# Patient Record
Sex: MALE | Race: WHITE | NOT HISPANIC OR LATINO | Employment: OTHER | URBAN - NONMETROPOLITAN AREA
[De-identification: names, ages, dates, MRNs, and addresses within clinical notes are randomized per-mention and may not be internally consistent; named-entity substitution may affect disease eponyms.]

---

## 2024-11-20 ENCOUNTER — OFFICE VISIT (OUTPATIENT)
Dept: CARDIOLOGY | Facility: CLINIC | Age: 89
End: 2024-11-20
Payer: MEDICARE

## 2024-11-20 VITALS
SYSTOLIC BLOOD PRESSURE: 176 MMHG | HEART RATE: 93 BPM | WEIGHT: 176.8 LBS | OXYGEN SATURATION: 95 % | DIASTOLIC BLOOD PRESSURE: 89 MMHG | BODY MASS INDEX: 26.8 KG/M2 | HEIGHT: 68 IN

## 2024-11-20 DIAGNOSIS — I10 ESSENTIAL HYPERTENSION: ICD-10-CM

## 2024-11-20 DIAGNOSIS — R06.02 SOB (SHORTNESS OF BREATH): ICD-10-CM

## 2024-11-20 DIAGNOSIS — R07.2 PRECORDIAL PAIN: ICD-10-CM

## 2024-11-20 DIAGNOSIS — E78.5 DYSLIPIDEMIA: Primary | ICD-10-CM

## 2024-11-20 DIAGNOSIS — Z87.891 FORMER SMOKER: ICD-10-CM

## 2024-11-20 PROBLEM — K21.9 GASTROESOPHAGEAL REFLUX DISEASE: Status: ACTIVE | Noted: 2024-11-20

## 2024-11-20 PROBLEM — Z86.69 HISTORY OF GUILLAIN-BARRE SYNDROME: Status: ACTIVE | Noted: 2024-11-20

## 2024-11-20 PROBLEM — Z86.69 HISTORY OF MACULAR DEGENERATION: Status: ACTIVE | Noted: 2022-11-06

## 2024-11-20 PROBLEM — E11.9 TYPE 2 DIABETES MELLITUS: Status: ACTIVE | Noted: 2023-12-04

## 2024-11-20 PROBLEM — C44.91 BASAL CELL CARCINOMA: Status: ACTIVE | Noted: 2024-11-20

## 2024-11-20 PROBLEM — N40.0 BENIGN PROSTATIC HYPERPLASIA: Status: ACTIVE | Noted: 2022-11-06

## 2024-11-20 PROCEDURE — 93000 ELECTROCARDIOGRAM COMPLETE: CPT | Performed by: INTERNAL MEDICINE

## 2024-11-20 PROCEDURE — 99204 OFFICE O/P NEW MOD 45 MIN: CPT | Performed by: INTERNAL MEDICINE

## 2024-11-20 RX ORDER — BRIMONIDINE TARTRATE 2 MG/ML
SOLUTION/ DROPS OPHTHALMIC
COMMUNITY

## 2024-11-20 RX ORDER — ATORVASTATIN CALCIUM 40 MG/1
40 TABLET, FILM COATED ORAL DAILY
Qty: 30 TABLET | Refills: 11 | Status: SHIPPED | OUTPATIENT
Start: 2024-11-20

## 2024-11-20 RX ORDER — NITROGLYCERIN 0.4 MG/1
TABLET SUBLINGUAL
Qty: 25 TABLET | Refills: 2 | Status: SHIPPED | OUTPATIENT
Start: 2024-11-20

## 2024-11-20 RX ORDER — ASPIRIN 81 MG/1
81 TABLET ORAL DAILY
Qty: 30 TABLET | Refills: 11 | Status: SHIPPED | OUTPATIENT
Start: 2024-11-20

## 2024-11-20 RX ORDER — DUTASTERIDE 0.5 MG/1
0.5 CAPSULE, LIQUID FILLED ORAL DAILY
COMMUNITY

## 2024-11-20 RX ORDER — AMLODIPINE BESYLATE 2.5 MG/1
2.5 TABLET ORAL DAILY
Qty: 30 TABLET | Refills: 11 | Status: SHIPPED | OUTPATIENT
Start: 2024-11-20

## 2024-11-20 RX ORDER — TAMSULOSIN HYDROCHLORIDE 0.4 MG/1
1 CAPSULE ORAL DAILY
COMMUNITY

## 2024-11-20 RX ORDER — FAMOTIDINE 20 MG/1
20 TABLET, FILM COATED ORAL DAILY
COMMUNITY

## 2024-11-20 RX ORDER — METOPROLOL TARTRATE 25 MG/1
12.5 TABLET, FILM COATED ORAL 2 TIMES DAILY
Qty: 30 TABLET | Refills: 11 | Status: SHIPPED | OUTPATIENT
Start: 2024-11-20

## 2024-11-20 NOTE — PROGRESS NOTES
Subjective   Esvin Flower is a 89 y.o. male     Chief Complaint   Patient presents with    Establish Care     Here for eval. Per pcp for ? Abnl. Office EKG per patient report    Shortness of Breath    Hyperlipidemia    Hypertension    Chest Pain       PROBLEM LIST:     SOB  HTN  Hyperlipidemia  DM, type 2  Macular Degen.  Skin CA s/p excision  GERD  Former smoker    Specialty Problems    None        HPI:  Mr. Esvin Flower is a 89-year-old patient of Dr Aleksandr Perla seen today for evaluation of EKG abnormalities, hypertension, and chest pain.    The patient is a very active 89-year-old male who states that he had an EKG done at Dr. Perla's office which prompted his evaluation here.  Initially, Mr. Flower described no symptoms suggestive of coronary artery disease.  However, on repeated questioning he describes that he has tightness in his chest when his throat is dry and he is having upper airway congestion.  This may or may not be an anginal equivalent.  However, he also describes a burning retrosternal chest discomfort which only occurs during physical activity.    Mr. Flower can do housework including mopping sweeping and vacuuming without difficulty.  However, if he walks uphill outdoors or carries objects he will develop chest burning and an increased sense of dyspnea.  Symptoms are always exertional in nature and always self-limited with rest.  There are no associated palpitations or lightheadedness or dizziness.  Symptoms have been relatively stable over.    The patient has no symptoms of congestive heart failure or dysrhythmia.                    PRIOR MEDICATIONS    Current Outpatient Medications on File Prior to Visit   Medication Sig Dispense Refill    brimonidine (ALPHAGAN) 0.2 % ophthalmic solution INSTILL 1 DROP INTO EACH EYE TWICE DAILY      dutasteride (AVODART) 0.5 MG capsule Take 1 capsule by mouth Daily.      famotidine (PEPCID) 20 MG tablet Take 1 tablet by mouth Daily.      Multiple Vitamins-Minerals  (PRESERVISION AREDS 2 PO) Take  by mouth 2 (Two) Times a Day.      tamsulosin (FLOMAX) 0.4 MG capsule 24 hr capsule Take 1 capsule by mouth Daily.      Unable to find 1 each 2 (Two) Times a Day. Super Beat Prostate       No current facility-administered medications on file prior to visit.       ALLERGIES:    Morphine and Penicillins    PAST MEDICAL HISTORY:    Past Medical History:   Diagnosis Date    Cancer     skin CA    Diabetes mellitus     Guillain Barré syndrome     Hyperlipidemia     Hypertension     Macular degeneration        SURGICAL HISTORY:    Past Surgical History:   Procedure Laterality Date    CYST REMOVAL      HERNIA REPAIR      SKIN CANCER EXCISION         SOCIAL HISTORY:    Social History     Socioeconomic History    Marital status:    Tobacco Use    Smoking status: Former     Types: Cigarettes    Smokeless tobacco: Never    Tobacco comments:     Used to smoke 1 PPD for approx. 20 yrs.    Substance and Sexual Activity    Alcohol use: Never    Drug use: Never       FAMILY HISTORY:    Family History   Problem Relation Age of Onset    No Known Problems Mother     Heart attack Father        Review of Systems   Constitutional:  Positive for diaphoresis (occas. night sweats) and fatigue. Negative for chills, fever and unexpected weight change.   HENT: Negative.     Eyes: Negative.    Respiratory:  Positive for shortness of breath.    Cardiovascular:  Positive for chest pain. Negative for palpitations and leg swelling.   Gastrointestinal:  Positive for constipation. Negative for blood in stool.   Endocrine: Positive for cold intolerance.   Genitourinary: Negative.    Musculoskeletal:  Positive for arthralgias and myalgias.   Skin: Negative.    Allergic/Immunologic: Negative.    Neurological: Negative.    Hematological:  Bruises/bleeds easily.   Psychiatric/Behavioral: Negative.         VISIT VITALS:  Vitals:    11/20/24 1042   BP: 176/89   BP Location: Left arm   Patient Position: Sitting   Pulse:  "93   SpO2: 95%   Weight: 80.2 kg (176 lb 12.8 oz)   Height: 172.7 cm (68\")      /89 (BP Location: Left arm, Patient Position: Sitting)   Pulse 93   Ht 172.7 cm (68\")   Wt 80.2 kg (176 lb 12.8 oz)   SpO2 95%   BMI 26.88 kg/m²     RECENT LABS:    Objective       Physical Exam  Vitals and nursing note reviewed.   Constitutional:       General: He is not in acute distress.     Appearance: He is well-developed.   HENT:      Head: Normocephalic and atraumatic.   Eyes:      Conjunctiva/sclera: Conjunctivae normal.      Pupils: Pupils are equal, round, and reactive to light.   Neck:      Vascular: No carotid bruit, hepatojugular reflux or JVD.      Trachea: No tracheal deviation.      Comments: Nl. Carotid upstrokes  Cardiovascular:      Rate and Rhythm: Normal rate and regular rhythm.      Pulses:           Radial pulses are 2+ on the right side and 2+ on the left side.      Heart sounds: Normal heart sounds, S1 normal and S2 normal. No murmur heard.     No friction rub. S4 sounds present. No S3 sounds.   Pulmonary:      Effort: Pulmonary effort is normal.      Breath sounds: Normal breath sounds. No wheezing, rhonchi or rales.      Comments: Nl. Expir. Phase  Nl. Breath sound intensity    Abdominal:      General: Bowel sounds are normal. There is no distension or abdominal bruit.      Palpations: Abdomen is soft. There is no mass.      Tenderness: There is no abdominal tenderness. There is no guarding or rebound.      Comments: No organomegaly   Musculoskeletal:         General: No tenderness or deformity. Normal range of motion.      Cervical back: Normal range of motion and neck supple.      Right lower leg: Edema present.      Left lower leg: Edema present.      Comments: LLE, trace edema, palpable pedal pulses  RLE, trace edema, palpable PT pedal pulse   Skin:     General: Skin is warm and dry.      Coloration: Skin is not pale.      Findings: No erythema or rash.   Neurological:      Mental Status: He is " alert and oriented to person, place, and time.   Psychiatric:         Behavior: Behavior normal.         Thought Content: Thought content normal.         Judgment: Judgment normal.           ECG 12 Lead    Date/Time: 11/20/2024 10:57 AM  Performed by: Alvarado Knight MD    Authorized by: Alvarado Knight MD  Previous ECG: no previous ECG available            Assessment & Plan   #1.  Exertional retrosternal burning which I feel is compatible with angina.  As the patient has no high risk clinical markers I think that empiric therapy is appropriate at this time as well as reserving formal restratification for an inadequate response to medical therapy or if there are any clinical findings to suggest acute coronary syndrome.  Therefore we will start amlodipine 2.5 mg daily, metoprolol 12.5 mg twice daily, aspirin 81 mg daily, and restart statin in the form of a atorvastatin 40 mg daily.  Mr. Flower was also given a prescription for sublingual nitroglycerin with instructions in its use.    2.  Systemic hypertension.  See #1 above.    3.  Dyslipidemia.  The patient was treated with simvastatin in the past.  Presumed coronary artery disease we will restart statin in the form of atorvastatin 40 mg daily with fasting lipid profile and liver enzymes in 6 to 8 weeks.    4.  EKG abnormalities.  EKG today demonstrates a sinus mechanism with first-degree AV block and very minor nonspecific ST segment changes.  We will continue to monitor.    5.  Mr. Flower will follow with Dr. Perla as instructed we will plan on seeing him in follow-up in 4 to 6 weeks to assess response to therapy or on a as needed basis for symptoms as discussed today which might represent an unstable coronary substrate, or adverse response to medications.   Diagnosis Plan   1. Dyslipidemia        2. Essential hypertension  ECG 12 Lead      3. SOB (shortness of breath)  ECG 12 Lead      4. Former smoker        5. Precordial pain            No follow-ups on  file.         Esvin Flower  reports that he has quit smoking. His smoking use included cigarettes. He has never used smokeless tobacco. I have educated him on the risk of diseases from using tobacco products such as cancer, COPD, and heart disease.       Advance Care Planning   ACP discussion was held with the patient during this visit. Patient does not have an advance directive, declines further assistance.            BMI is >= 25 and <30. (Overweight) The following options were offered after discussion;: pcp addressing               Electronically signed by:    Scribed for Alvarado Knight MD by Maria Eugenia Mensah LPN on November 20, 2024  at 11:40 EST    I, Alvarado Knight MD personally performed the services described in this documentation as scribed by the above named individual in my presence, and it is both accurate and complete. November 20, 2024 11:40 EST      Dictated Utilizing Dragon Dictation: Part of this note may be an electronic transcription/translation of spoken language to printed text using the Dragon Dictation System.

## 2024-11-25 ENCOUNTER — TELEPHONE (OUTPATIENT)
Dept: CARDIOLOGY | Facility: CLINIC | Age: 89
End: 2024-11-25
Payer: MEDICARE

## 2024-11-25 NOTE — TELEPHONE ENCOUNTER
The patient called triage to report that he thinks he might be having an allergic reaction to one of the meds he was put on last week. He reports that Sunday his feet and skin started to itch and burn real bad. He stopped all the new meds and wants to know what to do. Please advise.

## 2024-11-25 NOTE — TELEPHONE ENCOUNTER
Patient notified.    Alvarado Knight MD Hedrick, Mari-Alice, MA  Caller: Unspecified (Today,  8:39 AM)  Patient is to restart all medications prescribed at his last visit except for statin.  If he has further issues he is to call our office before changing any of his prescribed medications.

## 2024-12-18 ENCOUNTER — LAB (OUTPATIENT)
Dept: LAB | Facility: HOSPITAL | Age: 89
End: 2024-12-18
Payer: MEDICARE

## 2024-12-18 DIAGNOSIS — E78.5 DYSLIPIDEMIA: ICD-10-CM

## 2024-12-18 LAB
ALBUMIN SERPL-MCNC: 4.1 G/DL (ref 3.5–5.2)
ALP SERPL-CCNC: 119 U/L (ref 39–117)
ALT SERPL W P-5'-P-CCNC: 22 U/L (ref 1–41)
AST SERPL-CCNC: 22 U/L (ref 1–40)
BILIRUB CONJ SERPL-MCNC: <0.2 MG/DL (ref 0–0.3)
BILIRUB INDIRECT SERPL-MCNC: ABNORMAL MG/DL
BILIRUB SERPL-MCNC: 0.5 MG/DL (ref 0–1.2)
CHOLEST SERPL-MCNC: 116 MG/DL (ref 0–200)
HDLC SERPL-MCNC: 42 MG/DL (ref 40–60)
LDLC SERPL CALC-MCNC: 55 MG/DL (ref 0–100)
LDLC/HDLC SERPL: 1.29 {RATIO}
PROT SERPL-MCNC: 6.8 G/DL (ref 6–8.5)
TRIGL SERPL-MCNC: 100 MG/DL (ref 0–150)
VLDLC SERPL-MCNC: 19 MG/DL (ref 5–40)

## 2024-12-18 PROCEDURE — 80061 LIPID PANEL: CPT

## 2024-12-18 PROCEDURE — 36415 COLL VENOUS BLD VENIPUNCTURE: CPT

## 2024-12-18 PROCEDURE — 80076 HEPATIC FUNCTION PANEL: CPT

## 2024-12-20 ENCOUNTER — OFFICE VISIT (OUTPATIENT)
Dept: CARDIOLOGY | Facility: CLINIC | Age: 89
End: 2024-12-20
Payer: MEDICARE

## 2024-12-20 VITALS
DIASTOLIC BLOOD PRESSURE: 66 MMHG | WEIGHT: 181 LBS | HEART RATE: 60 BPM | HEIGHT: 68 IN | BODY MASS INDEX: 27.43 KG/M2 | SYSTOLIC BLOOD PRESSURE: 154 MMHG | OXYGEN SATURATION: 96 %

## 2024-12-20 DIAGNOSIS — E78.5 DYSLIPIDEMIA: Primary | ICD-10-CM

## 2024-12-20 DIAGNOSIS — I10 ESSENTIAL HYPERTENSION: ICD-10-CM

## 2024-12-20 DIAGNOSIS — Z87.891 FORMER SMOKER: ICD-10-CM

## 2024-12-20 DIAGNOSIS — R07.2 PRECORDIAL PAIN: ICD-10-CM

## 2024-12-20 DIAGNOSIS — R06.02 SOB (SHORTNESS OF BREATH): ICD-10-CM

## 2024-12-20 PROCEDURE — 99213 OFFICE O/P EST LOW 20 MIN: CPT | Performed by: INTERNAL MEDICINE

## 2024-12-20 RX ORDER — LOSARTAN POTASSIUM 25 MG/1
25 TABLET ORAL DAILY
Qty: 30 TABLET | Refills: 11 | Status: SHIPPED | OUTPATIENT
Start: 2024-12-20

## 2024-12-20 RX ORDER — METOPROLOL SUCCINATE 25 MG/1
25 TABLET, EXTENDED RELEASE ORAL DAILY
Qty: 30 TABLET | Refills: 11 | Status: SHIPPED | OUTPATIENT
Start: 2024-12-20

## 2024-12-20 NOTE — PROGRESS NOTES
Subjective   Esvin Flower is a 89 y.o. male     Chief Complaint   Patient presents with    Follow-up     Here for 4 week f/u    Hyperlipidemia    Hypertension       PROBLEM LIST:     SOB  HTN  Hyperlipidemia  DM, type 2  Macular Degen.  Skin CA s/p excision  GERD  Former smoker    Specialty Problems          Cardiology Problems    Essential hypertension             HPI:  Mr. Flower returns for follow-up on the above.    The retrosternal chest burning with activity which we felt represented angina has completely resolved.  He has had no symptoms of orthostasis or generalized cerebral hypoperfusion.  Blood pressures have not been checked at home.    Mr. Flower stopped atorvastatin because of muscle and joint pain.  We discussed starting simvastatin 20 mg daily which Dr. Perla states the patient tolerated in the past.  However, Mr. Flower does not wish to start any lipid-lowering therapy at this time.                      PRIOR MEDICATIONS    Current Outpatient Medications on File Prior to Visit   Medication Sig Dispense Refill    amLODIPine (NORVASC) 2.5 MG tablet Take 1 tablet by mouth Daily. 30 tablet 11    aspirin 81 MG EC tablet Take 1 tablet by mouth Daily. 30 tablet 11    brimonidine (ALPHAGAN) 0.2 % ophthalmic solution INSTILL 1 DROP INTO EACH EYE TWICE DAILY      dutasteride (AVODART) 0.5 MG capsule Take 1 capsule by mouth Daily.      famotidine (PEPCID) 20 MG tablet Take 1 tablet by mouth Daily.      metoprolol tartrate (LOPRESSOR) 25 MG tablet Take 0.5 tablets by mouth 2 (Two) Times a Day. 30 tablet 11    Multiple Vitamins-Minerals (PRESERVISION AREDS 2 PO) Take  by mouth 2 (Two) Times a Day.      nitroglycerin (NITROSTAT) 0.4 MG SL tablet 1 under the tongue as needed for angina, may repeat q5mins for up three doses 25 tablet 2    tamsulosin (FLOMAX) 0.4 MG capsule 24 hr capsule Take 1 capsule by mouth Daily.      Unable to find 1 each 2 (Two) Times a Day. Super Beat Prostate      [DISCONTINUED] atorvastatin  "(LIPITOR) 40 MG tablet Take 1 tablet by mouth Daily. 30 tablet 11     No current facility-administered medications on file prior to visit.       ALLERGIES:    Lipitor [atorvastatin], Morphine, and Penicillins    PAST MEDICAL HISTORY:    Past Medical History:   Diagnosis Date    Cancer     skin CA    Diabetes mellitus     Guillain Barré syndrome     Hyperlipidemia     Hypertension     Macular degeneration        SURGICAL HISTORY:    Past Surgical History:   Procedure Laterality Date    CYST REMOVAL      HERNIA REPAIR      SKIN CANCER EXCISION         SOCIAL HISTORY:    Social History     Socioeconomic History    Marital status:    Tobacco Use    Smoking status: Former     Types: Cigarettes    Smokeless tobacco: Never    Tobacco comments:     Used to smoke 1 PPD for approx. 20 yrs.    Substance and Sexual Activity    Alcohol use: Never    Drug use: Never       FAMILY HISTORY:    Family History   Problem Relation Age of Onset    No Known Problems Mother     Heart attack Father        Review of Systems   Constitutional:  Positive for fatigue.   HENT: Negative.     Eyes: Negative.    Respiratory: Negative.          Denies orthopnea/PND   Cardiovascular: Negative.    Gastrointestinal:  Positive for constipation.   Endocrine: Negative.    Genitourinary: Negative.    Musculoskeletal:  Positive for arthralgias and myalgias.   Skin: Negative.    Allergic/Immunologic: Negative.    Neurological: Negative.    Hematological:  Bruises/bleeds easily.   Psychiatric/Behavioral: Negative.         VISIT VITALS:  Vitals:    12/20/24 0944   BP: 154/66   BP Location: Left arm   Patient Position: Sitting   Pulse: 60   SpO2: 96%   Weight: 82.1 kg (181 lb)   Height: 172.7 cm (67.99\")      /66 (BP Location: Left arm, Patient Position: Sitting)   Pulse 60   Ht 172.7 cm (67.99\")   Wt 82.1 kg (181 lb)   SpO2 96%   BMI 27.53 kg/m²     RECENT LABS:    Objective       Physical Exam    Procedures      Assessment & Plan   #1.  " Probable coronary artery disease.  I believe that the symptoms of chest burning, which have now completely resolved, are an anginal equivalent.  As the patient is asymptomatic standpoint of ischemia we will pursue no further evaluation unless symptoms were to change.    2.  Systemic hypertension.  We will add losartan 25 mg daily to the patient's current regimen and follow blood pressures at home.    3.  Exertional dyspnea.  Per the patient's wife the symptoms appear to have improved as well.  We will continue to monitor.    4.  Mr. Flower will follow with Dr. Perla as instructed we will plan on seeing him in follow-up in 6-12 months or on a as needed basis as discussed at today's visit.   Diagnosis Plan   1. Dyslipidemia        2. Essential hypertension        3. Precordial pain        4. SOB (shortness of breath)        5. Former smoker            No follow-ups on file.         Esvin Flower  reports that he has quit smoking. His smoking use included cigarettes. He has never used smokeless tobacco. I have educated him on the risk of diseases from using tobacco products such as cancer, COPD, and heart disease.        Advance Care Planning   ACP discussion was held with the patient during this visit. Patient does not have an advance directive, declines further assistance.                         Electronically signed by:    Scribed for Alvarado Knight MD by Maria Eugenia Mensah LPN on December 20, 2024  at 09:51 EST    I, Alvarado Knight MD personally performed the services described in this documentation as scribed by the above named individual in my presence, and it is both accurate and complete. December 20, 2024 09:51 EST      Dictated Utilizing Dragon Dictation: Part of this note may be an electronic transcription/translation of spoken language to printed text using the Dragon Dictation System.

## 2025-02-17 ENCOUNTER — OFFICE VISIT (OUTPATIENT)
Dept: CARDIOLOGY | Facility: CLINIC | Age: OVER 89
End: 2025-02-17
Payer: MEDICARE

## 2025-02-17 VITALS
DIASTOLIC BLOOD PRESSURE: 59 MMHG | OXYGEN SATURATION: 96 % | HEART RATE: 66 BPM | BODY MASS INDEX: 25.37 KG/M2 | WEIGHT: 167.4 LBS | SYSTOLIC BLOOD PRESSURE: 135 MMHG | HEIGHT: 68 IN

## 2025-02-17 DIAGNOSIS — R07.2 PRECORDIAL PAIN: Primary | ICD-10-CM

## 2025-02-17 DIAGNOSIS — I10 ESSENTIAL HYPERTENSION: ICD-10-CM

## 2025-02-17 DIAGNOSIS — R60.0 BILATERAL LEG EDEMA: ICD-10-CM

## 2025-02-17 DIAGNOSIS — E78.5 DYSLIPIDEMIA: ICD-10-CM

## 2025-02-17 DIAGNOSIS — Z87.891 FORMER SMOKER: ICD-10-CM

## 2025-02-17 DIAGNOSIS — R06.02 SOB (SHORTNESS OF BREATH): ICD-10-CM

## 2025-02-17 DIAGNOSIS — R09.89 RALES: ICD-10-CM

## 2025-02-17 PROCEDURE — 99214 OFFICE O/P EST MOD 30 MIN: CPT | Performed by: INTERNAL MEDICINE

## 2025-02-17 RX ORDER — AMLODIPINE BESYLATE 5 MG/1
5 TABLET ORAL DAILY
COMMUNITY
Start: 2025-02-04

## 2025-02-17 RX ORDER — PANTOPRAZOLE SODIUM 40 MG/1
1 TABLET, DELAYED RELEASE ORAL DAILY
COMMUNITY
Start: 2025-02-12

## 2025-02-17 RX ORDER — FUROSEMIDE 40 MG/1
TABLET ORAL
Qty: 3 TABLET | Refills: 0 | Status: SHIPPED | OUTPATIENT
Start: 2025-02-17

## 2025-02-17 RX ORDER — ATORVASTATIN CALCIUM 40 MG/1
40 TABLET, FILM COATED ORAL DAILY
COMMUNITY

## 2025-02-17 RX ORDER — RANOLAZINE 500 MG/1
500 TABLET, EXTENDED RELEASE ORAL 2 TIMES DAILY
COMMUNITY

## 2025-02-17 RX ORDER — POTASSIUM CHLORIDE 1500 MG/1
TABLET, EXTENDED RELEASE ORAL
Qty: 3 TABLET | Refills: 0 | Status: SHIPPED | OUTPATIENT
Start: 2025-02-17

## 2025-02-17 NOTE — PROGRESS NOTES
Subjective   Esvin Flower is a 89 y.o. male     Chief Complaint   Patient presents with    Follow-up     Here for recent hosp. F/u    Shortness of Breath    Fatigue       PROBLEM LIST:     SOB  HTN  Hyperlipidemia  DM, type 2  Macular Degen.  Skin CA s/p excision  GERD  Former smoker    Specialty Problems          Cardiology Problems    Essential hypertension             HPI:  Mr. Flower returns for follow-up after recent hospitalization.    He was seen at [facility] emergency room with viral pneumonia and presumed superimposed bacterial infection.  He was severely hyponatremic and also had symptoms of CHF.  Details of that hospitalization are included in Mr. Flower chart in Epic.  He was eventually discharged with a serum sodium of 129.  He was now placed on fluid restriction and he recently saw Dr. Perla for labs were performed which demonstrated a sodium of 124 with a BUN and creatinine of 10 and 0.73.  Dr. Perla placed Mr. Joiner on 50 ounce fluid restriction and he is felt improved from the residual dyspnea, abdominal distention, and lower extremity edema he had at the time of discharge.    Mr. Flower has been compliant with medications as well as with fluid restriction.  He has some degree of urinary retention which resolved after Dr. Perla restarted medications that were discontinued during his hospitalization.  The patient still complains of some abdominal distention, weakness and fatigue, and increased shortness of breath above his baseline.                        PRIOR MEDICATIONS    Current Outpatient Medications on File Prior to Visit   Medication Sig Dispense Refill    amLODIPine (NORVASC) 5 MG tablet Take 1 tablet by mouth Daily.      aspirin 81 MG EC tablet Take 1 tablet by mouth Daily. 30 tablet 11    atorvastatin (LIPITOR) 40 MG tablet Take 1 tablet by mouth Daily.      brimonidine (ALPHAGAN) 0.2 % ophthalmic solution INSTILL 1 DROP INTO EACH EYE TWICE DAILY      dutasteride  (AVODART) 0.5 MG capsule Take 1 capsule by mouth Daily.      metoprolol succinate XL (TOPROL-XL) 25 MG 24 hr tablet Take 1 tablet by mouth Daily. 30 tablet 11    Multiple Vitamins-Minerals (PRESERVISION AREDS 2 PO) Take  by mouth 2 (Two) Times a Day.      pantoprazole (PROTONIX) 40 MG EC tablet Take 1 tablet by mouth Daily.      Probiotic Product (PROBIOTIC PO) Take  by mouth. 3 gummies daily      ranolazine (RANEXA) 500 MG 12 hr tablet Take 1 tablet by mouth 2 (Two) Times a Day.      tamsulosin (FLOMAX) 0.4 MG capsule 24 hr capsule Take 1 capsule by mouth Daily.      nitroglycerin (NITROSTAT) 0.4 MG SL tablet 1 under the tongue as needed for angina, may repeat q5mins for up three doses (Patient not taking: Reported on 2/17/2025) 25 tablet 2    [DISCONTINUED] amLODIPine (NORVASC) 2.5 MG tablet Take 1 tablet by mouth Daily. 30 tablet 11    [DISCONTINUED] famotidine (PEPCID) 20 MG tablet Take 1 tablet by mouth Daily.      [DISCONTINUED] losartan (Cozaar) 25 MG tablet Take 1 tablet by mouth Daily. 30 tablet 11    [DISCONTINUED] Unable to find 1 each 2 (Two) Times a Day. Super Beat Prostate       No current facility-administered medications on file prior to visit.       ALLERGIES:    Lipitor [atorvastatin], Morphine, and Penicillins    PAST MEDICAL HISTORY:    Past Medical History:   Diagnosis Date    Cancer     skin CA    Diabetes mellitus     Guillain Barré syndrome     Hyperlipidemia     Hypertension     Macular degeneration        SURGICAL HISTORY:    Past Surgical History:   Procedure Laterality Date    CYST REMOVAL      HERNIA REPAIR      SKIN CANCER EXCISION         SOCIAL HISTORY:    Social History     Socioeconomic History    Marital status:    Tobacco Use    Smoking status: Former     Types: Cigarettes    Smokeless tobacco: Never    Tobacco comments:     Used to smoke 1 PPD for approx. 20 yrs.    Substance and Sexual Activity    Alcohol use: Never    Drug use: Never       FAMILY HISTORY:    Family  "History   Problem Relation Age of Onset    No Known Problems Mother     Heart attack Father        Review of Systems   Constitutional:  Positive for fatigue.   HENT:  Positive for hearing loss (left). Negative for tinnitus.    Eyes: Negative.    Respiratory:  Positive for shortness of breath (recent flu / pneumonia).    Cardiovascular:  Positive for leg swelling. Negative for chest pain and palpitations.   Gastrointestinal:  Positive for abdominal distention (had been given \"salt\" tablets, improving since not taking tabs) and constipation.   Endocrine: Negative.    Genitourinary: Negative.    Musculoskeletal:  Positive for arthralgias and myalgias.   Skin: Negative.    Allergic/Immunologic: Negative.    Neurological:  Positive for dizziness (first episode was when backing car out today, spinning). Negative for light-headedness.   Hematological:  Bruises/bleeds easily.   Psychiatric/Behavioral:  Positive for sleep disturbance.        VISIT VITALS:  Vitals:    02/17/25 1404   BP: 135/59   BP Location: Left arm   Patient Position: Sitting   Pulse: 66   SpO2: 96%   Weight: 75.9 kg (167 lb 6.4 oz)   Height: 172.7 cm (67.99\")      /59 (BP Location: Left arm, Patient Position: Sitting)   Pulse 66   Ht 172.7 cm (67.99\")   Wt 75.9 kg (167 lb 6.4 oz)   SpO2 96%   BMI 25.46 kg/m²     RECENT LABS:    Objective       Physical Exam  Vitals and nursing note reviewed.   Constitutional:       General: He is not in acute distress.     Appearance: He is well-developed.   HENT:      Head: Normocephalic and atraumatic.   Eyes:      Conjunctiva/sclera: Conjunctivae normal.      Pupils: Pupils are equal, round, and reactive to light.   Neck:      Vascular: Hepatojugular reflux (3 cm) present. No carotid bruit or JVD.      Trachea: No tracheal deviation.      Comments: Nl. Carotid upstrokes  Cardiovascular:      Rate and Rhythm: Normal rate and regular rhythm.      Pulses:           Radial pulses are 2+ on the right side and 2+ " on the left side.      Heart sounds: S1 normal and S2 normal. Murmur heard.      No friction rub. S4 (soft) sounds present. No S3 sounds.      Comments: 1/6 TR  NO MR  NO AI  Pulmonary:      Effort: Pulmonary effort is normal.      Breath sounds: Rales (bi-basilar) present. No wheezing or rhonchi.      Comments: Nl. Expir. Phase  Nl. Breath sound intensity  Abdominal:      General: Bowel sounds are normal. There is no distension or abdominal bruit.      Palpations: Abdomen is soft. There is no mass.      Tenderness: There is no abdominal tenderness. There is no guarding or rebound.      Comments: No organomegaly   Musculoskeletal:         General: No tenderness or deformity. Normal range of motion.      Cervical back: Normal range of motion and neck supple.      Right lower leg: No edema.      Left lower leg: No edema.      Comments: LLE, no edema, palpable DP pedal pulse  RLE, no edema, palpable PT pedal pulse   Skin:     General: Skin is warm and dry.      Coloration: Skin is not pale.      Findings: No erythema or rash.   Neurological:      Mental Status: He is alert and oriented to person, place, and time.   Psychiatric:         Behavior: Behavior normal.         Thought Content: Thought content normal.         Judgment: Judgment normal.         Procedures      Assessment & Plan   #1.  Hyponatremia.  I believe the patient is being appropriately addressed currently with free water restriction and reinstitution of prior medications.    2.  Volume status.  I believe that Mr. Floewr is total body sodium overloaded although his sodium concentration remains low.  We will provide him with oral furosemide 40 mg daily for 3 days as well as potassium 20 mEq for 3 days after which he will stop that medication.  Hopefully we can accomplish additional diuresis without affecting serum sodium.  Mr. Flower will continue free water restriction and will continue a regular diet without additional sodium.    3.  Mr. Flower will follow  with Dr. Perla as instructed we will plan on seeing him in follow-up in 2 to 3 weeks or on an as-needed basis as discussed.   Diagnosis Plan   1. Precordial pain        2. Essential hypertension        3. Dyslipidemia        4. SOB (shortness of breath)        5. Former smoker            No follow-ups on file.         Esvin Flower  reports that he has quit smoking. His smoking use included cigarettes. He has never used smokeless tobacco. I have educated him on the risk of diseases from using tobacco products such as cancer, COPD, and heart disease.       Advance Care Planning   ACP discussion was held with the patient during this visit. Patient does not have an advance directive, declines further assistance.                            Electronically signed by:    Scribed for Alvarado Knight MD by Maria Eugenia Mensah LPN on February 17, 2025  at 14:12 EST    I, Alvarado Knight MD personally performed the services described in this documentation as scribed by the above named individual in my presence, and it is both accurate and complete. February 17, 2025 14:12 EST      Dictated Utilizing Dragon Dictation: Part of this note may be an electronic transcription/translation of spoken language to printed text using the Dragon Dictation System.

## 2025-03-19 ENCOUNTER — TELEPHONE (OUTPATIENT)
Dept: CARDIOLOGY | Facility: CLINIC | Age: OVER 89
End: 2025-03-19
Payer: MEDICARE

## 2025-03-19 NOTE — TELEPHONE ENCOUNTER
Patient is not feeling well, his pulse has been 57, bp has been good. He has some Sob, no dizziness, chest feels like a burning sensation in the heart, no palpitations, no chest pain.   none

## 2025-03-19 NOTE — TELEPHONE ENCOUNTER
Alvarado Knight MD to Maria Eugenia Mensah LPN      3/19/25  1:57 PM  Chest burning as described at the patient's first visit was felt to be an anginal equivalent.  He needs to be instructed to go to the emergency room for any recurrence of chest discomfort of any type, and if no recurrence he needs to be scheduled in our office within 72 hours.

## 2025-03-20 ENCOUNTER — TELEPHONE (OUTPATIENT)
Dept: CARDIOLOGY | Facility: CLINIC | Age: OVER 89
End: 2025-03-20
Payer: MEDICARE

## 2025-03-20 NOTE — TELEPHONE ENCOUNTER
Relay     Called and left a message to offer an appointment today with Abdullahi Nolasco if still available when patient calls back.

## 2025-03-25 ENCOUNTER — OFFICE VISIT (OUTPATIENT)
Dept: CARDIOLOGY | Facility: CLINIC | Age: OVER 89
End: 2025-03-25
Payer: MEDICARE

## 2025-03-25 VITALS
HEART RATE: 52 BPM | WEIGHT: 176 LBS | OXYGEN SATURATION: 98 % | HEIGHT: 68 IN | SYSTOLIC BLOOD PRESSURE: 126 MMHG | BODY MASS INDEX: 26.67 KG/M2 | DIASTOLIC BLOOD PRESSURE: 65 MMHG

## 2025-03-25 DIAGNOSIS — R06.02 SOB (SHORTNESS OF BREATH): ICD-10-CM

## 2025-03-25 DIAGNOSIS — R07.2 PRECORDIAL PAIN: Primary | ICD-10-CM

## 2025-03-25 DIAGNOSIS — I10 ESSENTIAL HYPERTENSION: ICD-10-CM

## 2025-03-25 PROCEDURE — 93000 ELECTROCARDIOGRAM COMPLETE: CPT | Performed by: PHYSICIAN ASSISTANT

## 2025-03-25 PROCEDURE — 1159F MED LIST DOCD IN RCRD: CPT | Performed by: PHYSICIAN ASSISTANT

## 2025-03-25 PROCEDURE — 1160F RVW MEDS BY RX/DR IN RCRD: CPT | Performed by: PHYSICIAN ASSISTANT

## 2025-03-25 PROCEDURE — 99214 OFFICE O/P EST MOD 30 MIN: CPT | Performed by: PHYSICIAN ASSISTANT

## 2025-03-25 NOTE — PROGRESS NOTES
Problem list     Subjective   Esvin Flower is a 89 y.o. male     Chief Complaint   Patient presents with    Follow-up     Chest discomfort     Shortness of Breath     With activity as in walking to mailbox and activities of daily living as in showering.   PROBLEM LIST:      SOB  HTN  Hyperlipidemia  DM, type 2  Macular Degen.  Skin CA s/p excision  GERD  Former smoker  9.  History of congestive heart failure, insufficient data.  HPI  The patient presents in the clinic today for follow-up.  He was seen at last evaluation for hospital follow-up.  He apparently had hyponatremia and congestive heart failure.  He was seen by his primary care provider and Dr. Knight at the time and treated accordingly.  He has done well in that regard, with no recurrent failure symptoms of any significance.  Hyponatremic status is followed by his primary care provider.  He is requested an evaluation today however because when he now notes his chest pain.  He notes chest discomfort with activity.  His dyspnea has intensified as well, progressing even since appropriate diuresis.  He reports that when he tries to walk to his mailbox, or even while trying to shower, he will develop chest tightness and increasing dyspnea.  He is concerned with this and would like to pursue evaluation.  He reports no PND orthopnea currently.  He has no dysrhythmic symptoms.  He has no further complaints.    Current Outpatient Medications on File Prior to Visit   Medication Sig Dispense Refill    amLODIPine (NORVASC) 5 MG tablet Take 1 tablet by mouth Daily.      brimonidine (ALPHAGAN) 0.2 % ophthalmic solution INSTILL 1 DROP INTO EACH EYE TWICE DAILY      dutasteride (AVODART) 0.5 MG capsule Take 1 capsule by mouth Daily.      Magnesium Hydroxide (MILK OF MAGNESIA PO) Take  by mouth. Occas.      metoprolol succinate XL (TOPROL-XL) 25 MG 24 hr tablet Take 1 tablet by mouth Daily. 30 tablet 11    Multiple Vitamins-Minerals (PRESERVISION AREDS 2 PO) Take  by  mouth 2 (Two) Times a Day.      nitroglycerin (NITROSTAT) 0.4 MG SL tablet 1 under the tongue as needed for angina, may repeat q5mins for up three doses 25 tablet 2    pantoprazole (PROTONIX) 40 MG EC tablet Take 1 tablet by mouth Daily.      Probiotic Product (PROBIOTIC PO) Take  by mouth. 3 gummies daily      ranolazine (RANEXA) 500 MG 12 hr tablet Take 1 tablet by mouth 2 (Two) Times a Day.      tamsulosin (FLOMAX) 0.4 MG capsule 24 hr capsule Take 1 capsule by mouth Daily.      aspirin 81 MG EC tablet Take 1 tablet by mouth Daily. (Patient not taking: Reported on 3/25/2025) 30 tablet 11    furosemide (LASIX) 40 MG tablet Take lasix 40 mg daily x 3 days (Patient not taking: Reported on 3/25/2025) 3 tablet 0    potassium chloride (KLOR-CON M20) 20 MEQ CR tablet Take 20 meq po daily x 3 days (Patient not taking: Reported on 3/25/2025) 3 tablet 0     No current facility-administered medications on file prior to visit.       Lipitor [atorvastatin], Morphine, and Penicillins    Past Medical History:   Diagnosis Date    Cancer     skin CA    Diabetes mellitus     Guillain Barré syndrome     Hyperlipidemia     Hypertension     Macular degeneration        Social History     Socioeconomic History    Marital status:    Tobacco Use    Smoking status: Former     Types: Cigarettes    Smokeless tobacco: Never    Tobacco comments:     Used to smoke 1 PPD for approx. 20 yrs.    Substance and Sexual Activity    Alcohol use: Never    Drug use: Never    Sexual activity: Defer       Family History   Problem Relation Age of Onset    No Known Problems Mother     Heart attack Father        Review of Systems   Constitutional:  Positive for chills and fatigue. Negative for diaphoresis and fever.   HENT: Negative.  Negative for hearing loss.    Eyes: Negative.  Negative for visual disturbance.   Respiratory:  Positive for chest tightness and shortness of breath. Negative for apnea, cough and wheezing.    Cardiovascular:  Positive  "for chest pain and leg swelling. Negative for palpitations.   Gastrointestinal: Negative.  Negative for abdominal pain and blood in stool.   Endocrine: Negative.    Genitourinary: Negative.  Negative for hematuria.   Musculoskeletal:  Positive for arthralgias (hands and shoulders). Negative for back pain, myalgias, neck pain and neck stiffness.   Skin: Negative.  Negative for rash and wound.   Allergic/Immunologic: Negative.  Negative for environmental allergies and food allergies.   Neurological:  Positive for weakness and light-headedness. Negative for dizziness, syncope, numbness and headaches.   Hematological:  Bruises/bleeds easily (aspirin).   Psychiatric/Behavioral: Negative.  Negative for sleep disturbance.        Objective   Vitals:    03/25/25 1554   BP: 126/65   Pulse: 52   SpO2: 98%   Weight: 79.8 kg (176 lb)   Height: 172.7 cm (68\")      /65   Pulse 52   Ht 172.7 cm (68\")   Wt 79.8 kg (176 lb)   SpO2 98%   BMI 26.76 kg/m²    Lab Results (most recent)       None          Physical Exam  Vitals and nursing note reviewed.   Constitutional:       General: He is not in acute distress.     Appearance: He is well-developed.   HENT:      Head: Normocephalic and atraumatic.   Eyes:      Conjunctiva/sclera: Conjunctivae normal.      Pupils: Pupils are equal, round, and reactive to light.   Neck:      Vascular: No JVD.      Trachea: No tracheal deviation.   Cardiovascular:      Rate and Rhythm: Normal rate and regular rhythm.      Heart sounds: Normal heart sounds.      Comments: Minimal systolic murmur noted mid lower left sternal border.  Pulmonary:      Effort: Pulmonary effort is normal.      Breath sounds: Normal breath sounds. Decreased air movement present.   Abdominal:      General: Bowel sounds are normal. There is no distension.      Palpations: Abdomen is soft. There is no mass.      Tenderness: There is no abdominal tenderness. There is no guarding or rebound.   Musculoskeletal:         " General: No tenderness or deformity. Normal range of motion.      Cervical back: Normal range of motion and neck supple.      Right lower le+      Left lower le+   Skin:     General: Skin is warm and dry.      Coloration: Skin is not pale.      Findings: No erythema or rash.   Neurological:      Mental Status: He is alert and oriented to person, place, and time.   Psychiatric:         Behavior: Behavior normal.         Thought Content: Thought content normal.         Judgment: Judgment normal.           Procedure     ECG 12 Lead    Date/Time: 3/25/2025 3:58 PM  Performed by: Jaswinder Randall PA    Authorized by: Jaswinder Randall PA  Comparison: compared with previous ECG from 2025  Comparison to previous ECG: Sinus rhythm, first-degree AV block, normal axis, no acute changes noted.             Assessment & Plan      Diagnosis Plan   1. Precordial pain  ECG 12 Lead    Adult Transthoracic Echo Complete W/ Cont if Necessary Per Protocol    Stress Test With Myocardial Perfusion One Day      2. SOB (shortness of breath)  Adult Transthoracic Echo Complete W/ Cont if Necessary Per Protocol    Stress Test With Myocardial Perfusion One Day      3. Essential hypertension            1.  The patient presents back for evaluation today, with concern being of increasing chest discomfort and dyspnea as outlined above.  Again he was recently admitted with complications related to hyponatremia and congestive heart failure.  He appears overall improved with regards to previously described and reported congestive heart failure.  He does pursue titration of diuretic therapy if needed.  Failure precautions have been reviewed in detail.    2.  With chest discomfort and symptoms otherwise, we will schedule for nuclear stress test for further risk stratification.  He would like to pursue that.    3.  I do not see that a recent echo was been performed.  We need to evaluate that specifically for systolic diastolic parameters,  as well as for cardiac structure otherwise.    4.  No adjustments in medications will be made for now.  Further as we can review stress and echo findings.  He will follow-up to the clinic as indicated, sooner for issues or abnormal test findings.     Patient did not bring med list or medicine bottles to appointment, med list has been reviewed and updated based on patient's knowledge of their meds.      Esvin CALIX Flower  reports that he has quit smoking. His smoking use included cigarettes. He has never used smokeless tobacco. Advance Care Planning   ACP discussion was held with the patient during this visit. Patient does not have an advance directive, declines further assistance.                 Electronically signed by:

## 2025-03-26 ENCOUNTER — TELEPHONE (OUTPATIENT)
Dept: CARDIOLOGY | Facility: CLINIC | Age: OVER 89
End: 2025-03-26
Payer: MEDICARE

## 2025-03-26 NOTE — TELEPHONE ENCOUNTER
Caller: ZAY LORIE    Relationship: Other    Best call back number: [phone]    What is the best time to reach you: ANY    Who are you requesting to speak with (clinical staff, provider,  specific staff member): CLINICAL      What was the call regarding: PATIENT IS WANTING TO KNOW IF THEY HAVE TO HAVE AN RODNEY FOR THE ECG DUE TO THE POWER OUTAGE YESTERDAY, OR IF IT IS JUST WALK IN    Is it okay if the provider responds through [email or web address]: NO

## 2025-03-26 NOTE — TELEPHONE ENCOUNTER
Called and spoke with patient who states can speak with justina regarding testing appointment's. Patient notified that once testing has been approved with insurance and scheduled will receive a call with appointment date and time. Patient verbalizes understanding.

## 2025-04-10 ENCOUNTER — HOSPITAL ENCOUNTER (OUTPATIENT)
Dept: CARDIOLOGY | Facility: HOSPITAL | Age: OVER 89
Discharge: HOME | End: 2025-04-10
Payer: MEDICARE

## 2025-04-10 DIAGNOSIS — R07.2 PRECORDIAL PAIN: ICD-10-CM

## 2025-04-10 DIAGNOSIS — R06.02 SOB (SHORTNESS OF BREATH): ICD-10-CM

## 2025-04-10 LAB
AV MEAN PRESS GRAD SYS DOP V1V2: 3.7 MMHG
AV VMAX SYS DOP: 127.6 CM/SEC
BH CV ECHO MEAS - ACS: 2.1 CM
BH CV ECHO MEAS - AO MAX PG: 6.5 MMHG
BH CV ECHO MEAS - AO ROOT DIAM: 3.4 CM
BH CV ECHO MEAS - AO V2 VTI: 30.6 CM
BH CV ECHO MEAS - EDV(CUBED): 58 ML
BH CV ECHO MEAS - EDV(MOD-SP4): 39.4 ML
BH CV ECHO MEAS - EF(MOD-SP4): 53.8 %
BH CV ECHO MEAS - ESV(CUBED): 6.3 ML
BH CV ECHO MEAS - ESV(MOD-SP4): 18.2 ML
BH CV ECHO MEAS - FS: 52.2 %
BH CV ECHO MEAS - IVS/LVPW: 1.08 CM
BH CV ECHO MEAS - IVSD: 1.55 CM
BH CV ECHO MEAS - LA DIMENSION: 3.8 CM
BH CV ECHO MEAS - LAT PEAK E' VEL: 5.4 CM/SEC
BH CV ECHO MEAS - LV DIASTOLIC VOL/BSA (35-75): 20.4 CM2
BH CV ECHO MEAS - LV MASS(C)D: 219.8 GRAMS
BH CV ECHO MEAS - LV SYSTOLIC VOL/BSA (12-30): 9.4 CM2
BH CV ECHO MEAS - LVIDD: 3.9 CM
BH CV ECHO MEAS - LVIDS: 1.85 CM
BH CV ECHO MEAS - LVPWD: 1.43 CM
BH CV ECHO MEAS - MED PEAK E' VEL: 5.7 CM/SEC
BH CV ECHO MEAS - MV A MAX VEL: 89.5 CM/SEC
BH CV ECHO MEAS - MV DEC TIME: 0.48 SEC
BH CV ECHO MEAS - MV E MAX VEL: 52.7 CM/SEC
BH CV ECHO MEAS - MV E/A: 0.59
BH CV ECHO MEAS - RAP SYSTOLE: 10 MMHG
BH CV ECHO MEAS - RVDD: 3.4 CM
BH CV ECHO MEAS - RVSP: 38 MMHG
BH CV ECHO MEAS - SV(MOD-SP4): 21.2 ML
BH CV ECHO MEAS - SVI(MOD-SP4): 11 ML/M2
BH CV ECHO MEAS - TR MAX PG: 28 MMHG
BH CV ECHO MEAS - TR MAX VEL: 264.7 CM/SEC
BH CV ECHO MEASUREMENTS AVERAGE E/E' RATIO: 9.5
IVRT: 172 MS
LEFT ATRIUM VOLUME INDEX: 23.8 ML/M2
LV EF 3D SEGMENTATION: 57 %

## 2025-04-10 PROCEDURE — A9500 TC99M SESTAMIBI: HCPCS | Performed by: INTERNAL MEDICINE

## 2025-04-10 PROCEDURE — 93017 CV STRESS TEST TRACING ONLY: CPT

## 2025-04-10 PROCEDURE — 93306 TTE W/DOPPLER COMPLETE: CPT

## 2025-04-10 PROCEDURE — 34310000005 TECHNETIUM SESTAMIBI: Performed by: INTERNAL MEDICINE

## 2025-04-10 PROCEDURE — 25010000002 REGADENOSON 0.4 MG/5ML SOLUTION: Performed by: INTERNAL MEDICINE

## 2025-04-10 PROCEDURE — 78452 HT MUSCLE IMAGE SPECT MULT: CPT

## 2025-04-10 RX ORDER — REGADENOSON 0.08 MG/ML
0.4 INJECTION, SOLUTION INTRAVENOUS
Status: COMPLETED | OUTPATIENT
Start: 2025-04-10 | End: 2025-04-10

## 2025-04-10 RX ADMIN — TECHNETIUM TC 99M SESTAMIBI 1 DOSE: 1 INJECTION INTRAVENOUS at 08:58

## 2025-04-10 RX ADMIN — TECHNETIUM TC 99M SESTAMIBI 1 DOSE: 1 INJECTION INTRAVENOUS at 10:51

## 2025-04-10 RX ADMIN — REGADENOSON 0.4 MG: 0.08 INJECTION, SOLUTION INTRAVENOUS at 10:51

## 2025-04-12 LAB
BH CV REST NUCLEAR ISOTOPE DOSE: 10 MCI
BH CV STRESS COMMENTS STAGE 1: NORMAL
BH CV STRESS DOSE REGADENOSON STAGE 1: 0.4
BH CV STRESS DURATION MIN STAGE 1: 0
BH CV STRESS DURATION SEC STAGE 1: 10
BH CV STRESS NUCLEAR ISOTOPE DOSE: 30 MCI
BH CV STRESS PROTOCOL 1: NORMAL
BH CV STRESS RECOVERY BP: NORMAL MMHG
BH CV STRESS RECOVERY HR: 63 BPM
BH CV STRESS STAGE 1: 1
MAXIMAL PREDICTED HEART RATE: 128 BPM
PERCENT MAX PREDICTED HR: 53.13 %
STRESS BASELINE BP: NORMAL MMHG
STRESS BASELINE HR: 61 BPM
STRESS PERCENT HR: 63 %
STRESS POST ESTIMATED WORKLOAD: 1 METS
STRESS POST EXERCISE DUR MIN: 2 MIN
STRESS POST PEAK BP: NORMAL MMHG
STRESS POST PEAK HR: 68 BPM
STRESS TARGET HR: 109 BPM

## 2025-04-14 ENCOUNTER — RESULTS FOLLOW-UP (OUTPATIENT)
Dept: CARDIOLOGY | Facility: CLINIC | Age: OVER 89
End: 2025-04-14
Payer: MEDICARE

## 2025-04-14 NOTE — TELEPHONE ENCOUNTER
----- Message from Teresa TREVIZO sent at 4/14/2025 12:26 PM EDT -----    ----- Message -----  From: Jaswinder Randall PA  Sent: 4/14/2025   8:59 AM EDT  To: Elysia He MA    Routine follow-up.  ----- Message -----  From: Alvarado Knight MD  Sent: 4/12/2025   6:38 PM EDT  To: MILAGRO Allred
